# Patient Record
Sex: MALE | Race: ASIAN | Employment: FULL TIME
[De-identification: names, ages, dates, MRNs, and addresses within clinical notes are randomized per-mention and may not be internally consistent; named-entity substitution may affect disease eponyms.]

---

## 2021-10-04 ENCOUNTER — NURSE TRIAGE (OUTPATIENT)
Dept: OTHER | Facility: CLINIC | Age: 31
End: 2021-10-04

## 2021-10-04 ENCOUNTER — TELEPHONE (OUTPATIENT)
Dept: FAMILY MEDICINE CLINIC | Age: 31
End: 2021-10-04

## 2021-10-04 NOTE — TELEPHONE ENCOUNTER
----- Message from Jerri Blevins sent at 10/4/2021  9:04 AM EDT -----  Regarding: /telephone  Contact: [phone]  Appointment not available    Caller's first and last name and relationship to patient (if not the patient): n/a       Best contact number: [phone]      Preferred date and time: Next available      Scheduled appointment date and time: n/a      Reason for appointment: NP is having really bad lower back pain      Details to clarify the request: Prostate pain  Call back after 3PM. Leave  if no answer      Jerri Blevins

## 2021-10-04 NOTE — TELEPHONE ENCOUNTER
Spoke with patient. Dayana Fields this issue he believes may be regarding prostate issue. He wants Dr. Marla Perez as has heard he has been here longer than .    Dayana Fields he only wants one doctor for all his issues. He was offered Oct 18 and declined.

## 2021-10-04 NOTE — TELEPHONE ENCOUNTER
Reason for Disposition   Severe rectal pain    Answer Assessment - Initial Assessment Questions  1. SYMPTOM:  \"What's the main symptom you're concerned about? \" (e.g., pain, itching, swelling, rash)          Pt has been having a feeling of pressure and gas type feeling in rectal area when he lays down. He has cramping in stomach. When he lays down he feels like he has to use the restroom. It is a burning sensation in rectal area. 2. ONSET: \"When did the start? \"          2 weeks ago     3. RECTAL PAIN: \"Do you have any pain around your rectum? \" \"How bad is the pain? \"  (Scale 1-10; or mild, moderate, severe)   - MILD (1-3): doesn't interfere with normal activities    - MODERATE (4-7): interferes with normal activities or awakens from sleep, limping    - SEVERE (8-10): excruciating pain, unable to have a bowel movement                      8-9/10- causes pt not to sleep     4. RECTAL ITCHING: \"Do you have any itching in this area? \" \"How bad is the itching? \"  (Scale 1-10; or mild, moderate, severe)   - MILD - doesn't interfere with normal activities    - MODERATE-SEVERE: interferes with normal activities or awakens from sleep           Denies     5. CONSTIPATION: \"Do you have constipation? \" If so, \"How bad is it? \"           LBM- last evening - it is normal, at times he might have a little push but nothing out of the norm     6. CAUSE: \"What do you think is causing the anus symptoms? \"            Unsure- pt has googled things and is very concerned about his sexual activities and masturbation     7. OTHER SYMPTOMS: \"Do you have any other symptoms? \"  (e.g., rectal bleeding, abdominal pain, vomiting, fever)          A lot of gas and bloating, lower back pain, at buttocks area 6/10, mild lower abd pain on the R and only feels it if he sits and crunches his stomach      8. PREGNANCY: \"Is there any chance you are pregnant? \" \"When was your last menstrual period? \"           NA    Protocols used: RECTAL SYMPTOMS-ADULT-OH    Warm transfer from [place] at [facility]. Triage indicates for pt to be seen NOW for severe rectal pain that started about 2 weeks ago and has increased over time to 8-9/10. Pt advised that if no aptts available he can go to [address] [place], [place] [address]. Warm transfer to Gerline Heads at [facility] for scheduling as recommended. Providers please do not respond to this encounter.

## 2024-04-10 ENCOUNTER — ANESTHESIA EVENT (OUTPATIENT)
Facility: HOSPITAL | Age: 34
End: 2024-04-10
Payer: COMMERCIAL

## 2024-04-10 ENCOUNTER — HOSPITAL ENCOUNTER (OUTPATIENT)
Facility: HOSPITAL | Age: 34
Setting detail: OUTPATIENT SURGERY
Discharge: HOME | End: 2024-04-10
Attending: INTERNAL MEDICINE | Admitting: INTERNAL MEDICINE
Payer: COMMERCIAL

## 2024-04-10 ENCOUNTER — ANESTHESIA (OUTPATIENT)
Facility: HOSPITAL | Age: 34
End: 2024-04-10
Payer: COMMERCIAL

## 2024-04-10 VITALS
RESPIRATION RATE: 17 BRPM | HEART RATE: 77 BPM | SYSTOLIC BLOOD PRESSURE: 106 MMHG | WEIGHT: 182 LBS | OXYGEN SATURATION: 99 % | BODY MASS INDEX: 26.96 KG/M2 | HEIGHT: 69 IN | DIASTOLIC BLOOD PRESSURE: 62 MMHG | TEMPERATURE: 98.9 F

## 2024-04-10 PROCEDURE — 88305 TISSUE EXAM BY PATHOLOGIST: CPT

## 2024-04-10 PROCEDURE — 3600007502: Performed by: INTERNAL MEDICINE

## 2024-04-10 PROCEDURE — 3600007512: Performed by: INTERNAL MEDICINE

## 2024-04-10 PROCEDURE — 7100000011 HC PHASE II RECOVERY - ADDTL 15 MIN: Performed by: INTERNAL MEDICINE

## 2024-04-10 PROCEDURE — 6360000002 HC RX W HCPCS: Performed by: NURSE ANESTHETIST, CERTIFIED REGISTERED

## 2024-04-10 PROCEDURE — 3700000000 HC ANESTHESIA ATTENDED CARE: Performed by: INTERNAL MEDICINE

## 2024-04-10 PROCEDURE — 7100000010 HC PHASE II RECOVERY - FIRST 15 MIN: Performed by: INTERNAL MEDICINE

## 2024-04-10 PROCEDURE — 3700000001 HC ADD 15 MINUTES (ANESTHESIA): Performed by: INTERNAL MEDICINE

## 2024-04-10 PROCEDURE — 2709999900 HC NON-CHARGEABLE SUPPLY: Performed by: INTERNAL MEDICINE

## 2024-04-10 RX ORDER — SODIUM CHLORIDE 9 MG/ML
INJECTION, SOLUTION INTRAVENOUS CONTINUOUS
Status: DISCONTINUED | OUTPATIENT
Start: 2024-04-10 | End: 2024-04-10 | Stop reason: HOSPADM

## 2024-04-10 RX ORDER — PROPOFOL 10 MG/ML
INJECTION, EMULSION INTRAVENOUS PRN
Status: DISCONTINUED | OUTPATIENT
Start: 2024-04-10 | End: 2024-04-10 | Stop reason: SDUPTHER

## 2024-04-10 RX ADMIN — PROPOFOL 175 MCG/KG/MIN: 10 INJECTION, EMULSION INTRAVENOUS at 10:18

## 2024-04-10 RX ADMIN — PROPOFOL 125 MG: 10 INJECTION, EMULSION INTRAVENOUS at 10:17

## 2024-04-10 NOTE — PROGRESS NOTES

## 2024-04-10 NOTE — H&P
[facility]  [address] [place] [address]  [phone]                     History and Physical     NAME: Darin Campos   :  1990   MRN:  [id number]     HPI:  Pt has family history of colon cancer. Presents today for screening colonoscopy. Pt reports no stx to me today.     No past surgical history on file.  No past medical history on file.     Not on File  No family history on file.  No current facility-administered medications for this encounter.         PHYSICAL EXAM:  General: WD, WN. Alert, cooperative, no acute distress    HEENT: NC, Atraumatic.  PERRLA, EOMI. Anicteric sclerae.  Lungs:  CTA Bilaterally. No Wheezing/Rhonchi/Rales.  Heart:  Regular  rhythm,  No murmur, No Rubs, No Gallops  Abdomen: Soft, Non distended, Non tender.  +Bowel sounds, no HSM  Extremities: No c/c/e  Neurologic:  CN 2-12 gi, Alert and oriented X 3.  No acute neurological distress   Psych:   Good insight. Not anxious nor agitated.           Assessment:   I have reviewed with the patient +/- family alternatives,benefits and risks for the procedure, as well as potential complications(with emphasis on, but not limited to, bleeding, perforation, cardiovascular/cerebrovascular/pulmonary events, reactions to the medications, infection, risk of missing a lesions/a cancer, and the imponderables including death), alternative options, and patient/family voices understanding.      Plan:   Endoscopic procedure  Conscious sedation or MAC

## 2024-04-10 NOTE — ANESTHESIA PRE PROCEDURE
Department of Anesthesiology  Preprocedure Note       Name:  Darin Campos   Age:  33 y.o.  :  1990                                          MRN:  [id number]         Date:  4/10/2024      Surgeon: Surgeon(s):  Damion Washington MD    Procedure: Procedure(s):  COLONOSCOPY    Medications prior to admission:   Prior to Admission medications    Not on File       Current medications:    Current Facility-Administered Medications   Medication Dose Route Frequency Provider Last Rate Last Admin   • 0.9 % sodium chloride infusion   IntraVENous Continuous Damion Washington MD           Allergies:  No Known Allergies    Problem List:  There is no problem list on file for this patient.      Past Medical History:        Diagnosis Date   • Family history of colon cancer in mother        Past Surgical History:        Procedure Laterality Date   • COLONOSCOPY     • KNEE ARTHROSCOPY Left     ACL, PCL, meniscus       Social History:    Social History     Tobacco Use   • Smoking status: Never   • Smokeless tobacco: Never   Substance Use Topics   • Alcohol use: Never                                Counseling given: Not Answered      Vital Signs (Current): There were no vitals filed for this visit.                                           BP Readings from Last 3 Encounters:   No data found for BP       NPO Status:                                                   Date of last liquid consumption: 24                        Date of last solid food consumption: 24    BMI:   Wt Readings from Last 3 Encounters:   No data found for Wt     There is no height or weight on file to calculate BMI.    CBC: No results found for: \"WBC\", \"RBC\", \"HGB\", \"HCT\", \"MCV\", \"RDW\", \"PLT\"    CMP: No results found for: \"NA\", \"K\", \"CL\", \"CO2\", \"BUN\", \"CREATININE\", \"GFRAA\", \"AGRATIO\", \"LABGLOM\", \"GLUCOSE\", \"GLU\", \"PROT\", \"CALCIUM\", \"BILITOT\", \"ALKPHOS\", \"AST\", \"ALT\"    POC Tests: No results for input(s): \"POCGLU\", \"POCNA\", \"POCK\", \"POCCL\",

## 2024-04-10 NOTE — OP NOTE
[facility]  [address] [place] [address]  [phone]                   Colonoscopy Operative Report      Indications:    Family history of coloretal cancer (screening only)     :  Damion Washington MD    Assistants: None    Referring Provider: Brandon Campos MD    Sedation:  MAC anesthesia Propofol    Procedure Details:  After informed consent was obtained with all risks and benefits of procedure explained and preoperative exam completed, the patient was taken to the endoscopy suite and placed in the left lateral decubitus position.  Upon sequential sedation as per above, a digital rectal exam was performed  And was normal.  The Olympus videocolonoscope  was inserted in the rectum and carefully advanced to the cecum, which was identified by the ileocecal valve and appendiceal orifice.  The quality of preparation was excellent.  The colonoscope was slowly withdrawn with careful evaluation between folds. Retroflexion in the rectum was performed and was normal..     Findings:   Rectum: Grade 1 internal hemorrhoid(s);  Sigmoid: normal  Descending Colon: normal  Transverse Colon: Diminutive < 5 mm polyp ( sessile) removed cold forceps one piece  Ascending Colon: normal  Cecum: normal  Terminal Ileum: normal    Interventions:  1 complete polypectomy was performed using cold biopsy forceps and the polyps was  retrieved    Specimen Removed:  specimen #1, 4 mm in size, located in the transverse colon removed by cold biopsy and sent for pathology    Implants: none     Complications: None.     EBL:  None.    Impression: Colon polyp - removed                        Internal hemorrhoids    Recommendations:   -Await pathology.  -If adenoma is present, repeat colonoscopy in 3 years.  -High fiber diet.     -Resume normal medication(s)  -Call office for pathology results in 1 week  -Call for any questions/concerns      Discharge Disposition:  Home in the company of a  when

## 2024-04-10 NOTE — DISCHARGE INSTRUCTIONS
[facility]  [address] [place] [address]  [phone]                   Darin Campos  [id number]  1990    COLON DISCHARGE INSTRUCTIONS    DISCOMFORT:  Redness at IV site- apply warm compress to area; if redness or soreness persist- contact your physician  There may be a slight amount of blood passed from the rectum  Gaseous discomfort- walking, belching will help relieve any discomfort  You may not operate a vehicle for 12 hours  You may not  engage in an occupation involving machinery or appliances for rest of today  You may not  drink alcoholic beverages for at least 12 hours  Avoid making any critical decisions for at least 24 hour    DIET:   High fiber diet.   - however -  remember your colon is empty and a heavy meal will produce gas.   Avoid these foods:  vegetables, fried / greasy foods, carbonated drinks for today     ACTIVITY:  It is recommended that you spend the remainder of the day resting -  avoid any strenuous activity.  CALL M.D.  ANY SIGN OF:   Increasing pain, nausea, vomiting  Abdominal distension (swelling)  New increased bleeding (oral or rectal)  Fever (chills)  Pain in chest area  Bloody discharge from nose or mouth  Shortness of breath    You may  resume your medications    Post procedure diagnosis:  small colon polyp and internal hemorhoids    Follow-up Instructions:    If a specimen was collected, you will receive a letter with the result by mail within two  weeks. Depending on the result this letter will specify your follow up colonoscopy date.      Please call us for any questions or concerns                     Darin Campos  [id number]  1990        DISCHARGE SUMMARY from Nurse    The following personal items collected during your admission are returned to you:   Dental Appliance:    Vision:    Hearing Aid:    Jewelry:    Clothing:    Other Valuables:    Valuables sent to safe: Dose (mL/hr) Propofol : 0 mL/hr    Detail Level: Generalized Detail Level: Simple Detail Level: Zone

## 2024-04-10 NOTE — ANESTHESIA POSTPROCEDURE EVALUATION
Department of Anesthesiology  Postprocedure Note    Patient: Darin Campos  MRN: [id number]  YOB: 1990  Date of evaluation: 4/10/2024    Procedure Summary       Date: 04/10/24 Room / Location: [facility] 03 / [facility] ENDOSCOPY    Anesthesia Start: 1013 Anesthesia Stop: 1044    Procedure: COLONOSCOPY Diagnosis:       Family history of colon cancer      (Family history of colon cancer [Z80.0])    Surgeons: Damion Washington MD Responsible Provider: Michael Montana MD    Anesthesia Type: MAC ASA Status: 1            Anesthesia Type: MAC    Adilia Phase I: Adilia Score: 10    Adilia Phase II:      Anesthesia Post Evaluation    Patient location during evaluation: PACU  Patient participation: complete - patient participated  Level of consciousness: awake  Pain score: 0  Airway patency: patent  Nausea & Vomiting: no nausea and no vomiting  Cardiovascular status: blood pressure returned to baseline  Respiratory status: acceptable  Hydration status: euvolemic  Multimodal analgesia pain management approach  Pain management: adequate    No notable events documented.

## 2024-07-30 ENCOUNTER — HOSPITAL ENCOUNTER (OUTPATIENT)
Facility: HOSPITAL | Age: 34
Discharge: HOME | End: 2024-08-02
Attending: PODIATRIST
Payer: COMMERCIAL

## 2024-07-30 DIAGNOSIS — M67.471 GANGLION, RIGHT ANKLE AND FOOT: ICD-10-CM

## 2024-07-30 PROCEDURE — 6360000004 HC RX CONTRAST MEDICATION: Performed by: PODIATRIST

## 2024-07-30 PROCEDURE — A9579 GAD-BASE MR CONTRAST NOS,1ML: HCPCS | Performed by: PODIATRIST

## 2024-07-30 PROCEDURE — 73720 MRI LWR EXTREMITY W/O&W/DYE: CPT

## 2024-07-30 RX ADMIN — GADOTERIDOL 17 ML: 279.3 INJECTION, SOLUTION INTRAVENOUS at 19:56

## (undated) DEVICE — FORCEP BX 3 2.2 MMX240 CM FOR ENDOSCP RADIAL JAW